# Patient Record
Sex: FEMALE | Race: WHITE
[De-identification: names, ages, dates, MRNs, and addresses within clinical notes are randomized per-mention and may not be internally consistent; named-entity substitution may affect disease eponyms.]

---
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
## 2022-05-04 NOTE — NUR
Pt ambulated off unit with family. Detail Level: Zone Hide Additional Notes?: No Include Location In Plan?: Yes